# Patient Record
Sex: FEMALE | Race: BLACK OR AFRICAN AMERICAN | NOT HISPANIC OR LATINO | Employment: FULL TIME | URBAN - METROPOLITAN AREA
[De-identification: names, ages, dates, MRNs, and addresses within clinical notes are randomized per-mention and may not be internally consistent; named-entity substitution may affect disease eponyms.]

---

## 2021-03-25 DIAGNOSIS — Z23 ENCOUNTER FOR IMMUNIZATION: ICD-10-CM

## 2021-04-14 ENCOUNTER — OFFICE VISIT (OUTPATIENT)
Dept: FAMILY MEDICINE CLINIC | Facility: HOSPITAL | Age: 51
End: 2021-04-14
Payer: COMMERCIAL

## 2021-04-14 VITALS
DIASTOLIC BLOOD PRESSURE: 88 MMHG | SYSTOLIC BLOOD PRESSURE: 148 MMHG | TEMPERATURE: 98.6 F | HEIGHT: 63 IN | WEIGHT: 196 LBS | HEART RATE: 88 BPM | BODY MASS INDEX: 34.73 KG/M2

## 2021-04-14 DIAGNOSIS — Z00.00 LABORATORY EXAM ORDERED AS PART OF ROUTINE GENERAL MEDICAL EXAMINATION: ICD-10-CM

## 2021-04-14 DIAGNOSIS — Z12.31 ENCOUNTER FOR SCREENING MAMMOGRAM FOR MALIGNANT NEOPLASM OF BREAST: ICD-10-CM

## 2021-04-14 DIAGNOSIS — Z00.00 WELLNESS EXAMINATION: Primary | ICD-10-CM

## 2021-04-14 PROCEDURE — 99203 OFFICE O/P NEW LOW 30 MIN: CPT | Performed by: FAMILY MEDICINE

## 2021-04-14 PROCEDURE — 3008F BODY MASS INDEX DOCD: CPT | Performed by: FAMILY MEDICINE

## 2021-04-14 PROCEDURE — 3725F SCREEN DEPRESSION PERFORMED: CPT | Performed by: FAMILY MEDICINE

## 2021-04-14 NOTE — PATIENT INSTRUCTIONS

## 2021-04-14 NOTE — PROGRESS NOTES
Assessment/Plan:         Diagnoses and all orders for this visit:    Wellness examination    Encounter for screening mammogram for malignant neoplasm of breast  -     Mammo screening bilateral w 3d & cad; Future    BMI 34 0-34 9,adult    Laboratory exam ordered as part of routine general medical examination  -     CBC and Platelet; Future  -     Comprehensive metabolic panel; Future  -     Hemoglobin A1C With EAG; Future  -     Lipid Panel with Direct LDL reflex; Future  -     TSH, 3rd generation; Future  -     CBC and Platelet  -     Comprehensive metabolic panel  -     Hemoglobin A1C With EAG  -     Lipid Panel with Direct LDL reflex  -     TSH, 3rd generation          Subjective:      Patient ID: Nanette Moralez is a 48 y o  female  New patient introduction    Aiming for weight loss by keeping at 1500 shalonda, does cardio 5 days a week  Takes a MVI and collagen    Had wellness check through work and noted the cholesterol was somewhat elevated    Works at [employer], stress is a part of everyday  Sleeping is usually pretty good except for Sunday, sometimes uses Melatonin    FH- father with Type 1 DM         Mother  Htn         1 brother and 5 sisters    Plans to see Dr Norma Ingram for gyn      The following portions of the patient's history were reviewed and updated as appropriate: allergies, current medications, past family history, past medical history, past social history, past surgical history and problem list     Review of Systems   Constitutional: Negative for fatigue and unexpected weight change  HENT: Negative  Eyes: Negative for visual disturbance  Respiratory: Negative  Cardiovascular: Negative  Genitourinary: Negative  Musculoskeletal: Negative  Neurological: Negative  Hematological: Negative  Psychiatric/Behavioral: Negative  All other systems reviewed and are negative          Objective:      /88   Pulse 88   Temp 98 6 °F (37 °C) (Tympanic)   Ht 5' 3" (1 6 m)   Wt 88 9 kg (196 lb)   BMI 34 72 kg/m²          Physical Exam  Vitals signs and nursing note reviewed  Constitutional:       Appearance: Normal appearance  HENT:      Head: Normocephalic and atraumatic  Right Ear: Tympanic membrane, ear canal and external ear normal       Left Ear: Tympanic membrane, ear canal and external ear normal       Mouth/Throat:      Mouth: Mucous membranes are moist    Eyes:      Extraocular Movements: Extraocular movements intact  Pupils: Pupils are equal, round, and reactive to light  Neck:      Vascular: No carotid bruit  Comments: Thyroid mildly nodular with multiple small nodules, no dominant nodule or enlargement  Cardiovascular:      Rate and Rhythm: Normal rate and regular rhythm  Pulses: Normal pulses  Heart sounds: Normal heart sounds  Pulmonary:      Effort: Pulmonary effort is normal       Breath sounds: Normal breath sounds  Abdominal:      General: Abdomen is flat  Palpations: Abdomen is soft  Musculoskeletal: Normal range of motion  Right lower leg: No edema  Left lower leg: No edema  Neurological:      General: No focal deficit present  Mental Status: She is alert and oriented to person, place, and time  Psychiatric:         Mood and Affect: Mood normal          Behavior: Behavior normal          Thought Content: Thought content normal          Judgment: Judgment normal        BMI Counseling: Body mass index is 34 72 kg/m²  The BMI is above normal  Nutrition recommendations include reducing portion sizes, decreasing overall calorie intake and moderation in carbohydrate intake  Exercise recommendations include exercising 3-5 times per week

## 2021-04-17 LAB
ALBUMIN SERPL-MCNC: 4.2 G/DL (ref 3.6–5.1)
ALBUMIN/GLOB SERPL: 1.3 (CALC) (ref 1–2.5)
ALP SERPL-CCNC: 48 U/L (ref 37–153)
ALT SERPL-CCNC: 18 U/L (ref 6–29)
AST SERPL-CCNC: 17 U/L (ref 10–35)
BILIRUB SERPL-MCNC: 0.6 MG/DL (ref 0.2–1.2)
BUN SERPL-MCNC: 17 MG/DL (ref 7–25)
BUN/CREAT SERPL: NORMAL (CALC) (ref 6–22)
CALCIUM SERPL-MCNC: 9.5 MG/DL (ref 8.6–10.4)
CHLORIDE SERPL-SCNC: 103 MMOL/L (ref 98–110)
CHOLEST SERPL-MCNC: 211 MG/DL
CHOLEST/HDLC SERPL: 2.4 (CALC)
CO2 SERPL-SCNC: 25 MMOL/L (ref 20–32)
CREAT SERPL-MCNC: 1.01 MG/DL (ref 0.5–1.05)
ERYTHROCYTE [DISTWIDTH] IN BLOOD BY AUTOMATED COUNT: 12.8 % (ref 11–15)
EST. AVERAGE GLUCOSE BLD GHB EST-MCNC: 108 (CALC)
EST. AVERAGE GLUCOSE BLD GHB EST-SCNC: 6 (CALC)
GLOBULIN SER CALC-MCNC: 3.3 G/DL (CALC) (ref 1.9–3.7)
GLUCOSE SERPL-MCNC: 87 MG/DL (ref 65–99)
HBA1C MFR BLD: 5.4 % OF TOTAL HGB
HCT VFR BLD AUTO: 41.7 % (ref 35–45)
HDLC SERPL-MCNC: 89 MG/DL
HGB BLD-MCNC: 13.8 G/DL (ref 11.7–15.5)
LDLC SERPL CALC-MCNC: 106 MG/DL (CALC)
MCH RBC QN AUTO: 30.3 PG (ref 27–33)
MCHC RBC AUTO-ENTMCNC: 33.1 G/DL (ref 32–36)
MCV RBC AUTO: 91.6 FL (ref 80–100)
NONHDLC SERPL-MCNC: 122 MG/DL (CALC)
PLATELET # BLD AUTO: 273 THOUSAND/UL (ref 140–400)
PMV BLD REES-ECKER: 11.7 FL (ref 7.5–12.5)
POTASSIUM SERPL-SCNC: 4.4 MMOL/L (ref 3.5–5.3)
PROT SERPL-MCNC: 7.5 G/DL (ref 6.1–8.1)
RBC # BLD AUTO: 4.55 MILLION/UL (ref 3.8–5.1)
SL AMB EGFR AFRICAN AMERICAN: 75 ML/MIN/1.73M2
SL AMB EGFR NON AFRICAN AMERICAN: 65 ML/MIN/1.73M2
SODIUM SERPL-SCNC: 138 MMOL/L (ref 135–146)
TRIGL SERPL-MCNC: 73 MG/DL
TSH SERPL-ACNC: 0.75 MIU/L
WBC # BLD AUTO: 5.5 THOUSAND/UL (ref 3.8–10.8)

## 2021-04-19 ENCOUNTER — TELEPHONE (OUTPATIENT)
Dept: FAMILY MEDICINE CLINIC | Facility: HOSPITAL | Age: 51
End: 2021-04-19

## 2021-04-19 NOTE — TELEPHONE ENCOUNTER
----- Message from Ken Serrato MD sent at 4/19/2021  7:05 AM EDT -----  Please inform that all labs were excellent including thyroid level

## 2022-06-20 ENCOUNTER — OFFICE VISIT (OUTPATIENT)
Dept: FAMILY MEDICINE CLINIC | Facility: HOSPITAL | Age: 52
End: 2022-06-20
Payer: COMMERCIAL

## 2022-06-20 VITALS
WEIGHT: 219.8 LBS | HEART RATE: 90 BPM | SYSTOLIC BLOOD PRESSURE: 128 MMHG | TEMPERATURE: 98.2 F | BODY MASS INDEX: 38.95 KG/M2 | HEIGHT: 63 IN | DIASTOLIC BLOOD PRESSURE: 80 MMHG

## 2022-06-20 DIAGNOSIS — H81.12 BENIGN PAROXYSMAL POSITIONAL VERTIGO OF LEFT EAR: ICD-10-CM

## 2022-06-20 DIAGNOSIS — Z12.31 ENCOUNTER FOR SCREENING MAMMOGRAM FOR MALIGNANT NEOPLASM OF BREAST: ICD-10-CM

## 2022-06-20 DIAGNOSIS — H61.22 IMPACTED CERUMEN OF LEFT EAR: Primary | ICD-10-CM

## 2022-06-20 PROCEDURE — 69210 REMOVE IMPACTED EAR WAX UNI: CPT | Performed by: FAMILY MEDICINE

## 2022-06-20 PROCEDURE — 3725F SCREEN DEPRESSION PERFORMED: CPT | Performed by: FAMILY MEDICINE

## 2022-06-20 PROCEDURE — 99213 OFFICE O/P EST LOW 20 MIN: CPT | Performed by: FAMILY MEDICINE

## 2022-06-20 PROCEDURE — 3008F BODY MASS INDEX DOCD: CPT | Performed by: FAMILY MEDICINE

## 2022-06-20 PROCEDURE — 1036F TOBACCO NON-USER: CPT | Performed by: FAMILY MEDICINE

## 2022-06-20 RX ORDER — LORATADINE 10 MG/1
10 TABLET ORAL DAILY
COMMUNITY

## 2022-06-20 NOTE — PROGRESS NOTES
Ear cerumen removal    Date/Time: 6/20/2022 7:06 PM  Performed by: Harshil Avery MD  Authorized by: Harshil Avery MD   Universal Protocol:  Consent: Verbal consent obtained  Written consent not obtained  Risks and benefits: risks, benefits and alternatives were discussed  Consent given by: patient  Time out: Immediately prior to procedure a "time out" was called to verify the correct patient, procedure, equipment, support staff and site/side marked as required  Timeout called at: 6/20/2022 7:06 PM   Patient understanding: patient states understanding of the procedure being performed      Patient location:  Clinic  Procedure details:     Local anesthetic:  None    Location:  L ear    Procedure type: irrigation with instrumentation      Instrumentation: curette      Approach:  Natural orifice  Post-procedure details:     Complication:  None    Hearing quality:  Improved    Patient tolerance of procedure:   Tolerated well, no immediate complications

## 2022-06-20 NOTE — PROGRESS NOTES
Assessment/Plan:         Diagnoses and all orders for this visit:    Impacted cerumen of left ear    BMI 38 0-38 9,adult    Benign paroxysmal positional vertigo of left ear    Encounter for screening mammogram for malignant neoplasm of breast  -     Mammo screening bilateral w 3d & cad; Future    Other orders  -     loratadine (CLARITIN) 10 mg tablet; Take 10 mg by mouth daily          Subjective:      Patient ID: Chetan Turpin is a 46 y o  female  Visit for subacute vertigo    Mainly positional especially in the morning getting out of bed when turning to the L side to turn off the alarm clock  Rapid resolution of symptoms  Some nausea with it    No history of motion sickness  Has perimenopause and was given a cream      The following portions of the patient's history were reviewed and updated as appropriate: allergies, current medications, past family history, past medical history, past social history, past surgical history and problem list     Review of Systems      Objective:      /80   Pulse 90   Temp 98 2 °F (36 8 °C)   Ht 5' 3" (1 6 m)   Wt 99 7 kg (219 lb 12 8 oz)   BMI 38 94 kg/m²          Physical Exam  Vitals and nursing note reviewed  Constitutional:       Appearance: Normal appearance  HENT:      Right Ear: Tympanic membrane, ear canal and external ear normal       Left Ear: There is impacted cerumen  Cardiovascular:      Pulses: Normal pulses  Heart sounds: Normal heart sounds  Pulmonary:      Effort: Pulmonary effort is normal       Breath sounds: Normal breath sounds  Neurological:      Mental Status: She is alert     Psychiatric:         Mood and Affect: Mood normal

## 2022-06-24 ENCOUNTER — TELEPHONE (OUTPATIENT)
Dept: FAMILY MEDICINE CLINIC | Facility: HOSPITAL | Age: 52
End: 2022-06-24

## 2022-10-11 PROBLEM — H61.22 IMPACTED CERUMEN OF LEFT EAR: Status: RESOLVED | Noted: 2022-06-20 | Resolved: 2022-10-11

## 2024-04-23 ENCOUNTER — NURSE TRIAGE (OUTPATIENT)
Age: 54
End: 2024-04-23

## 2024-04-23 NOTE — TELEPHONE ENCOUNTER
"Patient called in regarding pain in the jaw that is radiating to the ear. Patient states that it was painful last evening when biting down. States that ocassionally does have sensitive teeth that react to heat.patient states that she wears a mouth guard at night. Appointment made for tomorrow with NP Daniele.   Reason for Disposition  • Nursing judgment    Answer Assessment - Initial Assessment Questions  1. REASON FOR CALL: \"What is your main concern right now?\"      Jaw pain  2. ONSET: \"When did the jaw pain start?\"      Past few weeks  3. SEVERITY: \"How bad is the pain?\"      moderate  4. FEVER: \"Do you have a fever?\"      denies  5. OTHER SYMPTOMS: \"Do you have any other new symptoms?\"      Ear pain  6. INTERVENTIONS AND RESPONSE: \"What have you done so far to try to make this better? What medications have you used?\"      Mouth guard at night   7. PREGNANCY: \"Is there any chance you are pregnant?\"      N.a    Protocols used: No Protocol Available-ADULT-[place]    "

## 2024-04-25 ENCOUNTER — TELEPHONE (OUTPATIENT)
Dept: FAMILY MEDICINE CLINIC | Facility: HOSPITAL | Age: 54
End: 2024-04-25

## 2024-04-25 ENCOUNTER — OFFICE VISIT (OUTPATIENT)
Dept: FAMILY MEDICINE CLINIC | Facility: HOSPITAL | Age: 54
End: 2024-04-25
Payer: COMMERCIAL

## 2024-04-25 VITALS
BODY MASS INDEX: 40.26 KG/M2 | HEIGHT: 63 IN | TEMPERATURE: 97.7 F | DIASTOLIC BLOOD PRESSURE: 84 MMHG | SYSTOLIC BLOOD PRESSURE: 180 MMHG | WEIGHT: 227.2 LBS | HEART RATE: 91 BPM

## 2024-04-25 DIAGNOSIS — R03.0 ELEVATED BLOOD PRESSURE READING: ICD-10-CM

## 2024-04-25 DIAGNOSIS — H92.02 ACUTE PAIN OF LEFT EAR: Primary | ICD-10-CM

## 2024-04-25 DIAGNOSIS — H61.23 EXCESSIVE CERUMEN IN BOTH EAR CANALS: ICD-10-CM

## 2024-04-25 DIAGNOSIS — R68.84 JAW PAIN: ICD-10-CM

## 2024-04-25 PROCEDURE — 99213 OFFICE O/P EST LOW 20 MIN: CPT | Performed by: NURSE PRACTITIONER

## 2024-04-25 PROCEDURE — 69210 REMOVE IMPACTED EAR WAX UNI: CPT | Performed by: NURSE PRACTITIONER

## 2024-04-25 NOTE — PROGRESS NOTES
Name: Cee Negron      : 1970      MRN: [id number]  Encounter Provider: LEI Price  Encounter Date: 2024   Encounter department: [facility] [address]     Assessment & Plan     1. Acute pain of left ear  Comments:  likely multifactorial w/known TMJ, excessive cerumen, & ETD - wax removed & advise she start daily flonase, see dentist for TMJ eval    2. Jaw pain  Comments:  with known TMJ, f/u with dentist for further eval/treatment    3. Excessive cerumen in both ear canals  Comments:  fully removed from left canal w/curette, partially removed from right canal - advise    4. Elevated blood pressure reading  Comments:  advise she monitor outside of office few times/week & return in 1 month with readings for recheck; limit dietary Na and caffeine           Subjective      The presents with c/o L sided ear and jaw pain. The patient explains that she began to have pain in the L side of her jaw about 4 days ago. The patient started to have increased pressure into her L ear in the following days. The patient has been taking OTC Ibuprofen for the pain with improvement. The patient has been able to eat and drink. The patient expresses that she has started to develop seasonal allergies, as she has had her windows open at home.In addition, the patient explains that she does have an undiagnosed hx of TMJ per her dentist, where she had similar complaints in the fall. The patient presents with concerns as prior, she did not have pain that radiated into the ear.         Review of Systems   HENT:  Positive for ear pain (L ear) and postnasal drip. Negative for congestion, facial swelling, sinus pressure, sinus pain, sore throat, tinnitus and voice change.    Eyes: Negative.    Respiratory: Negative.     Cardiovascular: Negative.    Allergic/Immunologic: Positive for environmental allergies.   Neurological: Negative.        Current Outpatient Medications on File Prior to Visit  "  Medication Sig    loratadine (CLARITIN) 10 mg tablet Take 10 mg by mouth daily       Objective     BP (!) 180/84   Pulse 91   Temp 97.7 °F (36.5 °C)   Ht 5' 3\" (1.6 m)   Wt 103 kg (227 lb 3.2 oz)   BMI 40.25 kg/m²     Physical Exam  Vitals and nursing note reviewed.   Constitutional:       General: She is not in acute distress.     Appearance: Normal appearance. She is not ill-appearing.   HENT:      Head: Normocephalic and atraumatic.      Right Ear: External ear normal. No decreased hearing noted. No tenderness. There is impacted cerumen. No mastoid tenderness. Tympanic membrane is not erythematous.      Left Ear: External ear normal. No decreased hearing noted. No tenderness. A middle ear effusion is present. There is impacted cerumen. No mastoid tenderness. Tympanic membrane is not erythematous.      Ears:      Comments: Excessive cerumen in bilat canals - fully removed w/curette on left, partially removed w/curette on right     Mouth/Throat:      Lips: Pink. No lesions.      Mouth: Mucous membranes are moist.      Dentition: Normal dentition.      Pharynx: Oropharynx is clear. No pharyngeal swelling or posterior oropharyngeal erythema.   Eyes:      General: No scleral icterus.     Conjunctiva/sclera: Conjunctivae normal.   Neck:      Thyroid: No thyroid mass, thyromegaly or thyroid tenderness.   Cardiovascular:      Rate and Rhythm: Normal rate and regular rhythm.      Heart sounds: Normal heart sounds, S1 normal and S2 normal. No murmur heard.  Pulmonary:      Effort: Pulmonary effort is normal.      Breath sounds: Normal breath sounds and air entry. No decreased air movement. No decreased breath sounds, wheezing, rhonchi or rales.   Musculoskeletal:      Cervical back: Normal range of motion and neck supple.   Lymphadenopathy:      Head:      Right side of head: No submental, submandibular, preauricular or posterior auricular adenopathy.      Left side of head: No submental, submandibular, preauricular " or posterior auricular adenopathy.      Cervical: No cervical adenopathy.   Skin:     General: Skin is warm and dry.   Neurological:      Mental Status: She is alert.         Ear cerumen removal    Date/Time: 4/25/2024 9:46 AM    Performed by: LEI Price  Authorized by: LEI Price  Universal Protocol:  Procedure performed by: (NP student)  Consent: Verbal consent obtained.  Timeout called at: 4/25/2024 9:56 AM.  Patient understanding: patient states understanding of the procedure being performed    Patient location:  Clinic  Procedure details:     Local anesthetic:  None    Location:  L ear and R ear    Procedure type: curette      Approach:  External  Post-procedure details:     Complication:  None    Hearing quality:  Normal    Patient tolerance of procedure:  Tolerated well, no immediate complications        LEI Price

## 2024-12-19 ENCOUNTER — OFFICE VISIT (OUTPATIENT)
Dept: FAMILY MEDICINE CLINIC | Facility: HOSPITAL | Age: 54
End: 2024-12-19
Payer: COMMERCIAL

## 2024-12-19 VITALS
TEMPERATURE: 97.5 F | HEART RATE: 76 BPM | SYSTOLIC BLOOD PRESSURE: 162 MMHG | OXYGEN SATURATION: 99 % | DIASTOLIC BLOOD PRESSURE: 96 MMHG | WEIGHT: 225 LBS | BODY MASS INDEX: 39.87 KG/M2 | HEIGHT: 63 IN

## 2024-12-19 DIAGNOSIS — Z00.00 ANNUAL PHYSICAL EXAM: ICD-10-CM

## 2024-12-19 DIAGNOSIS — E66.812 CLASS 2 SEVERE OBESITY DUE TO EXCESS CALORIES WITH SERIOUS COMORBIDITY AND BODY MASS INDEX (BMI) OF 39.0 TO 39.9 IN ADULT (HCC): ICD-10-CM

## 2024-12-19 DIAGNOSIS — E66.01 CLASS 2 SEVERE OBESITY DUE TO EXCESS CALORIES WITH SERIOUS COMORBIDITY AND BODY MASS INDEX (BMI) OF 39.0 TO 39.9 IN ADULT (HCC): ICD-10-CM

## 2024-12-19 DIAGNOSIS — Z12.11 SCREEN FOR COLON CANCER: Primary | ICD-10-CM

## 2024-12-19 DIAGNOSIS — R03.0 ELEVATED BLOOD PRESSURE READING WITHOUT DIAGNOSIS OF HYPERTENSION: ICD-10-CM

## 2024-12-19 DIAGNOSIS — Z12.31 ENCOUNTER FOR SCREENING MAMMOGRAM FOR BREAST CANCER: ICD-10-CM

## 2024-12-19 PROCEDURE — 99396 PREV VISIT EST AGE 40-64: CPT | Performed by: NURSE PRACTITIONER

## 2024-12-19 NOTE — PROGRESS NOTES
Adult Annual Physical  Name: Cee Negron      : 1970      MRN: [id number]  Encounter Provider: LEI Narvaez  Encounter Date: 2024   Encounter department: [facility] [address]     Assessment & Plan  Annual physical exam         Class 2 severe obesity due to excess calories with serious comorbidity and body mass index (BMI) of 39.0 to 39.9 in adult (HCC)    Orders:    CBC and differential; Future    Comprehensive metabolic panel; Future    Lipid panel; Future    TSH, 3rd generation with Free T4 reflex; Future    Hemoglobin A1C With EAG; Future    Ambulatory referral to Nutrition Services; Future    Elevated blood pressure reading without diagnosis of hypertension  Bp is elevated and she reports it has been at other appointments.   Unsure if this is white coat or HTN.   Instructed to start taking BP at home. Instructions on home BP monitoring given.   F/U in 4 weeks and bring readings to that appointment.        Screen for colon cancer    Orders:    Cologuard    Encounter for screening mammogram for breast cancer    Orders:    Mammo screening bilateral w 3d and cad; Future      Immunizations and preventive care screenings were discussed with patient today. Appropriate education was printed on patient's after visit summary.    Counseling:  Alcohol/drug use: discussed moderation in alcohol intake, the recommendations for healthy alcohol use, and avoidance of illicit drug use.  Dental Health: discussed importance of regular tooth brushing, flossing, and dental visits.  Injury prevention: discussed safety/seat belts, safety helmets, smoke detectors, carbon monoxide detectors, and smoking near bedding or upholstery.  Sexual health: discussed sexually transmitted diseases, partner selection, use of condoms, avoidance of unintended pregnancy, and contraceptive alternatives.  Exercise: the importance of regular exercise/physical activity was discussed. Recommend exercise 3-5  "times per week for at least 30 minutes.          History of Present Illness     Adult Annual Physical:  Patient presents for annual physical. Diet used to be better. Lately no energy to exercise. Reports BP has been high at other appointments. Does not check at home. Mom with HTN. Dad had diabetes. .     Diet and Physical Activity:  - Diet/Nutrition: well balanced diet.  - Exercise: no formal exercise.    General Health:  - Sleep: sleeps poorly and snores loudly. difficulty staying asleep  - Hearing: normal hearing bilateral ears.  - Vision: wears glasses and goes for regular eye exams.  - Dental: regular dental visits.    /GYN Health:  - Follows with GYN: yes.   - Menopause: perimenopausal.   - Contraception: male partner had vasectomy.      Advanced Care Planning:  - Has an advanced directive?: no    - Has a durable medical POA?: no    - ACP document given to patient?: yes      Review of Systems   Constitutional:  Positive for fatigue. Negative for activity change, appetite change, chills, diaphoresis, fever and unexpected weight change.   HENT:  Positive for congestion and postnasal drip. Negative for dental problem, drooling, ear discharge, ear pain, facial swelling, hearing loss, mouth sores, nosebleeds, rhinorrhea, sinus pressure, sinus pain, sneezing, sore throat, tinnitus, trouble swallowing and voice change.    Eyes: Negative.    Respiratory:  Positive for wheezing (with lying down). Negative for apnea, cough, choking, chest tightness, shortness of breath and stridor.    Cardiovascular: Negative.    Gastrointestinal: Negative.    Endocrine: Negative.    Genitourinary: Negative.    Musculoskeletal: Negative.    Skin: Negative.    Neurological: Negative.    Hematological: Negative.    Psychiatric/Behavioral: Negative.           Objective   /96 (Patient Position: Sitting, Cuff Size: Standard)   Pulse 76   Temp 97.5 °F (36.4 °C) (Tympanic)   Ht 5' 3\" (1.6 m)   Wt 102 kg (225 lb)   SpO2 99%   BMI " 39.86 kg/m²     Physical Exam  Vitals reviewed.   Constitutional:       Appearance: Normal appearance. She is obese.   HENT:      Head: Normocephalic and atraumatic.      Right Ear: Tympanic membrane, ear canal and external ear normal.      Left Ear: Tympanic membrane, ear canal and external ear normal.      Nose: Nose normal.      Mouth/Throat:      Mouth: Mucous membranes are moist.      Pharynx: Oropharynx is clear.   Eyes:      Conjunctiva/sclera: Conjunctivae normal.      Pupils: Pupils are equal, round, and reactive to light.   Neck:      Thyroid: No thyromegaly.   Cardiovascular:      Rate and Rhythm: Normal rate and regular rhythm.      Heart sounds: Normal heart sounds. No murmur heard.  Pulmonary:      Effort: Pulmonary effort is normal.      Breath sounds: Normal breath sounds.   Abdominal:      General: Abdomen is flat. Bowel sounds are normal.      Palpations: Abdomen is soft. There is no hepatomegaly or splenomegaly.      Tenderness: There is no abdominal tenderness.   Musculoskeletal:         General: Normal range of motion.      Cervical back: Normal range of motion and neck supple.   Skin:     General: Skin is warm and dry.      Capillary Refill: Capillary refill takes less than 2 seconds.   Neurological:      General: No focal deficit present.      Mental Status: She is alert and oriented to person, place, and time.   Psychiatric:         Mood and Affect: Mood normal.         Behavior: Behavior normal.         Thought Content: Thought content normal.         Judgment: Judgment normal.

## 2024-12-19 NOTE — PATIENT INSTRUCTIONS
"Patient Education     Routine physical for adults   The Basics   Written by the doctors and editors at [facility]   What is a physical? -- A physical is a routine visit, or \"check-up,\" with your doctor. You might also hear it called a \"wellness visit\" or \"preventive visit.\"  During each visit, the doctor will:   Ask about your physical and mental health   Ask about your habits, behaviors, and lifestyle   Do an exam   Give you vaccines if needed   Talk to you about any medicines you take   Give advice about your health   Answer your questions  Getting regular check-ups is an important part of taking care of your health. It can help your doctor find and treat any problems you have. But it's also important for preventing health problems.  A routine physical is different from a \"sick visit.\" A sick visit is when you see a doctor because of a health concern or problem. Since physicals are scheduled ahead of time, you can think about what you want to ask the doctor.  How often should I get a physical? -- It depends on your age and health. In general, for people age 21 years and older:   If you are younger than 50 years, you might be able to get a physical every 3 years.   If you are 50 years or older, your doctor might recommend a physical every year.  If you have an ongoing health condition, like diabetes or high blood pressure, your doctor will probably want to see you more often.  What happens during a physical? -- In general, each visit will include:   Physical exam - The doctor or nurse will check your height, weight, heart rate, and blood pressure. They will also look at your eyes and ears. They will ask about how you are feeling and whether you have any symptoms that bother you.   Medicines - It's a good idea to bring a list of all the medicines you take to each doctor visit. Your doctor will talk to you about your medicines and answer any questions. Tell them if you are having any side effects that bother you. You " "should also tell them if you are having trouble paying for any of your medicines.   Habits and behaviors - This includes:   Your diet   Your exercise habits   Whether you smoke, drink alcohol, or use drugs   Whether you are sexually active   Whether you feel safe at home  Your doctor will talk to you about things you can do to improve your health and lower your risk of health problems. They will also offer help and support. For example, if you want to quit smoking, they can give you advice and might prescribe medicines. If you want to improve your diet or get more physical activity, they can help you with this, too.   Lab tests, if needed - The tests you get will depend on your age and situation. For example, your doctor might want to check your:   Cholesterol   Blood sugar   Iron level   Vaccines - The recommended vaccines will depend on your age, health, and what vaccines you already had. Vaccines are very important because they can prevent certain serious or deadly infections.   Discussion of screening - \"Screening\" means checking for diseases or other health problems before they cause symptoms. Your doctor can recommend screening based on your age, risk, and preferences. This might include tests to check for:   Cancer, such as breast, prostate, cervical, ovarian, colorectal, prostate, lung, or skin cancer   Sexually transmitted infections, such as chlamydia and gonorrhea   Mental health conditions like depression and anxiety  Your doctor will talk to you about the different types of screening tests. They can help you decide which screenings to have. They can also explain what the results might mean.   Answering questions - The physical is a good time to ask the doctor or nurse questions about your health. If needed, they can refer you to other doctors or specialists, too.  Adults older than 65 years often need other care, too. As you get older, your doctor will talk to you about:   How to prevent falling at " home   Hearing or vision tests   Memory testing   How to take your medicines safely   Making sure that you have the help and support you need at home  All topics are updated as new evidence becomes available and our peer review process is complete.  This topic retrieved from [employer] on: May 02, 2024.  Topic 031675 Version 1.0  Release: 32.4.3 - C32.122  © 2024 UpToDate, Inc. and/or its affiliates. All rights reserved.  Consumer Information Use and Disclaimer   Disclaimer: This generalized information is a limited summary of diagnosis, treatment, and/or medication information. It is not meant to be comprehensive and should be used as a tool to help the user understand and/or assess potential diagnostic and treatment options. It does NOT include all information about conditions, treatments, medications, side effects, or risks that may apply to a specific patient. It is not intended to be medical advice or a substitute for the medical advice, diagnosis, or treatment of a health care provider based on the health care provider's examination and assessment of a patient's specific and unique circumstances. Patients must speak with a health care provider for complete information about their health, medical questions, and treatment options, including any risks or benefits regarding use of medications. This information does not endorse any treatments or medications as safe, effective, or approved for treating a specific patient. UpToDate, Inc. and its affiliates disclaim any warranty or liability relating to this information or the use thereof.The use of this information is governed by the Terms of Use, available at https://www.woltersSmarter Grid Solutionsuwer.com/en/know/clinical-effectiveness-terms. 2024© UpToDate, Inc. and its affiliates and/or licensors. All rights reserved.  Copyright   © 2024 UpToDate, Inc. and/or its affiliates. All rights reserved.    Patient Education     Mediterranean diet   The Basics   Written by the doctors and  editors at [employer]   What is a Mediterranean diet? -- A Mediterranean diet is a heart-healthy way of eating. It includes foods and cooking styles from many countries around the [place] Sea, like [place] and [place]. The exact foods included vary from place to place.  A Mediterranean diet involves eating a lot of fruits, vegetables, nuts, and whole grains. It uses olive oil instead of other fats. It also includes some fish, poultry, and dairy products, but not a lot of red meat.  Wine is often thought of as part of a Mediterranean diet. It is not needed, and you might choose not to include it. If you do drink alcohol, limit the amount to:   For females, no more than 1 drink a day   For males, no more than 2 drinks a day  What are the benefits of a Mediterranean diet? -- A Mediterranean diet can help you:   Improve your overall health, and help you lose weight   Lower your risk of stroke   Lower your risk of heart problems such as a heart attack   Manage your blood sugar if you have diabetes  What can I eat and drink on a Mediterranean diet? -- A Mediterranean diet is more of an eating pattern than a strict diet. Try to cover two-thirds of your plate with fresh fruits and vegetables. Some examples of foods that are often part of this pattern:   Grains - Whole grains like whole-grain bread and pasta, oats, couscous, brown rice, barley, and orzo.   Fruits - Many kinds and colors of fresh, frozen, dried, or canned fruits. Frozen or canned fruits with 100% fruit juice or water (without added sugar). Examples include apples, pears, berries, melons, bananas, plums, raisins, figs, and peaches.   Vegetables - Many kinds and colors of fresh, frozen, or canned vegetables. If canned, low sodium or salt free. If frozen, without added fat and sodium. Examples include avocados, peppers, tomatoes, spinach, kale, beans, carrots, peas, olives, cucumbers, hummus, soybeans, lentils, and kidney beans.   Dairy - Low-fat milk, cheese,  and other dairy products. Greek yogurt, kefir, and plant-based milk alternatives like soy milk.   Lean meats, poultry, seafood, and proteins - [place], tuna, cod, and other fish. Shrimp, clams, scallops, and mussels. White meat chicken and turkey, eggs, dried beans, lentils, and tofu. Nuts such as walnuts, almonds, pecans, hazelnuts, cashews, peanuts, and nut butters. Seeds such as pumpkin, sesame, flax, and sunflower seeds.   Fats, oils, and other foods - Foods with healthy fats found in fish, nuts, and avocados. Olive oil or vegetable oils such as canola oil. Use onions, garlic, spices, and herbs to season food.  What foods and drinks should I avoid or limit on a Mediterranean diet? -- A Mediterranean diet involves avoiding or limiting certain types of foods. Try to avoid foods with additives like artificial sweeteners. Avoid foods that are processed, refined, or preserved. These are often foods with a very long shelf life.   Grains to avoid - White bread, pasta, white rice, crackers, and biscuits.   Fruits to avoid - Fruits canned or frozen with extra sugar.   Vegetables to avoid - Commercially prepared potatoes and vegetable mixes, regular canned vegetables and juices, and vegetables frozen with sauce or pickled vegetables.   Dairy to avoid - Whole-fat dairy products like cheese, ice cream, whole milk, cream, and buttermilk.   Lean meats, poultry, seafood, and proteins to avoid - Red meat such as beef, pork, and lamb. Processed meats such as sausages, deli meats, salami, hot dogs, and feldman.   Fats, oils, and other foods to avoid - Butter, margarine, lard, gravies, sauces, and salad dressing. Cookies, cakes, candy, doughnuts, muffins, and other sweets.  All topics are updated as new evidence becomes available and our peer review process is complete.  This topic retrieved from [email or web address] on: Apr 04, 2024.  Topic 583064 Version 2.0  Release: 32.2.4 - C32.93  © 2024 UpToDate, Inc. and/or its affiliates. All rights  reserved.  Consumer Information Use and Disclaimer   Disclaimer: This generalized information is a limited summary of diagnosis, treatment, and/or medication information. It is not meant to be comprehensive and should be used as a tool to help the user understand and/or assess potential diagnostic and treatment options. It does NOT include all information about conditions, treatments, medications, side effects, or risks that may apply to a specific patient. It is not intended to be medical advice or a substitute for the medical advice, diagnosis, or treatment of a health care provider based on the health care provider's examination and assessment of a patient's specific and unique circumstances. Patients must speak with a health care provider for complete information about their health, medical questions, and treatment options, including any risks or benefits regarding use of medications. This information does not endorse any treatments or medications as safe, effective, or approved for treating a specific patient. UpToDate, Inc. and its affiliates disclaim any warranty or liability relating to this information or the use thereof.The use of this information is governed by the Terms of Use, available at https://www.woltersTongtechuwer.com/en/know/clinical-effectiveness-terms. 2024© UpToDate, Inc. and its affiliates and/or licensors. All rights reserved.  Copyright   © 2024 UpToDate, Inc. and/or its affiliates. All rights reserved.

## 2025-01-13 ENCOUNTER — OFFICE VISIT (OUTPATIENT)
Dept: FAMILY MEDICINE CLINIC | Facility: HOSPITAL | Age: 55
End: 2025-01-13
Payer: COMMERCIAL

## 2025-01-13 VITALS
HEART RATE: 88 BPM | OXYGEN SATURATION: 99 % | SYSTOLIC BLOOD PRESSURE: 158 MMHG | BODY MASS INDEX: 40.57 KG/M2 | WEIGHT: 229 LBS | DIASTOLIC BLOOD PRESSURE: 86 MMHG | TEMPERATURE: 97.6 F | HEIGHT: 63 IN

## 2025-01-13 DIAGNOSIS — J40 TRACHEOBRONCHITIS: Primary | ICD-10-CM

## 2025-01-13 PROCEDURE — 99214 OFFICE O/P EST MOD 30 MIN: CPT | Performed by: NURSE PRACTITIONER

## 2025-01-13 RX ORDER — PREDNISONE 10 MG/1
TABLET ORAL
Qty: 32 TABLET | Refills: 0 | Status: SHIPPED | OUTPATIENT
Start: 2025-01-13

## 2025-01-13 NOTE — PROGRESS NOTES
"Name: Cee Negron      : 1970      MRN: [id number]  Encounter Provider: LEI Narvaez  Encounter Date: 2025   Encounter department: [facility] [address]   :  Assessment & Plan  Tracheobronchitis  Will trial prednisone. I suspect viral even though cough is present for over one month.   Well appearing and no fever.   She will call with no improvement or any worsening.   Orders:    predniSONE 10 mg tablet; 4 tabs x 3 days, 3 tabs x 3 days 2 tabs x 3 days 1 tab x 3 days, 1/2 tab x 3 days then stop           History of Present Illness     Cough started over 1 month ago. Was taking OTC cough med. Was getting better but got worse again. Wheezing with lying down. Cough is productive. No fever or chills. Had one episode of sob with going up steps. Had runny nose which has improved. No post nasal drip. No ear pain or sore throat.       Review of Systems   Constitutional:  Negative for chills and fever.   HENT:  Negative for congestion, ear pain, rhinorrhea and sore throat.    Respiratory:  Positive for cough, shortness of breath and wheezing.        Objective   /86 (Patient Position: Sitting, Cuff Size: Standard)   Pulse 88   Temp 97.6 °F (36.4 °C) (Tympanic)   Ht 5' 3\" (1.6 m)   Wt 104 kg (229 lb)   SpO2 99%   BMI 40.57 kg/m²      Physical Exam  Vitals reviewed.   Constitutional:       Appearance: Normal appearance.   Cardiovascular:      Rate and Rhythm: Normal rate and regular rhythm.      Heart sounds: Normal heart sounds. No murmur heard.  Pulmonary:      Effort: Pulmonary effort is normal.      Breath sounds: Normal breath sounds.      Comments: Mild wheezing over trachea.   Skin:     General: Skin is warm and dry.   Neurological:      Mental Status: She is alert and oriented to person, place, and time.   Psychiatric:         Mood and Affect: Mood normal.         Behavior: Behavior normal.         Thought Content: Thought content normal.         Judgment: " Judgment normal.

## 2025-01-22 ENCOUNTER — RESULTS FOLLOW-UP (OUTPATIENT)
Dept: FAMILY MEDICINE CLINIC | Facility: HOSPITAL | Age: 55
End: 2025-01-22

## 2025-01-22 LAB
ALBUMIN SERPL-MCNC: 3.9 G/DL (ref 3.6–5.1)
ALBUMIN/GLOB SERPL: 1.1 (CALC) (ref 1–2.5)
ALP SERPL-CCNC: 71 U/L (ref 37–153)
ALT SERPL-CCNC: 41 U/L (ref 6–29)
AST SERPL-CCNC: 24 U/L (ref 10–35)
BASOPHILS # BLD AUTO: 88 CELLS/UL (ref 0–200)
BASOPHILS NFR BLD AUTO: 0.6 %
BILIRUB SERPL-MCNC: 0.4 MG/DL (ref 0.2–1.2)
BUN SERPL-MCNC: 24 MG/DL (ref 7–25)
BUN/CREAT SERPL: 22 (CALC) (ref 6–22)
CALCIUM SERPL-MCNC: 8.8 MG/DL (ref 8.6–10.4)
CHLORIDE SERPL-SCNC: 101 MMOL/L (ref 98–110)
CHOLEST SERPL-MCNC: 235 MG/DL
CHOLEST/HDLC SERPL: 2.5 (CALC)
CO2 SERPL-SCNC: 28 MMOL/L (ref 20–32)
CREAT SERPL-MCNC: 1.1 MG/DL (ref 0.5–1.03)
EOSINOPHIL # BLD AUTO: 277 CELLS/UL (ref 15–500)
EOSINOPHIL NFR BLD AUTO: 1.9 %
ERYTHROCYTE [DISTWIDTH] IN BLOOD BY AUTOMATED COUNT: 13.3 % (ref 11–15)
EST. AVERAGE GLUCOSE BLD GHB EST-MCNC: 131 MG/DL
EST. AVERAGE GLUCOSE BLD GHB EST-SCNC: 7.3 MMOL/L
GFR/BSA.PRED SERPLBLD CYS-BASED-ARV: 60 ML/MIN/1.73M2
GLOBULIN SER CALC-MCNC: 3.7 G/DL (CALC) (ref 1.9–3.7)
GLUCOSE SERPL-MCNC: 91 MG/DL (ref 65–99)
HBA1C MFR BLD: 6.2 % OF TOTAL HGB
HCT VFR BLD AUTO: 41.1 % (ref 35–45)
HDLC SERPL-MCNC: 93 MG/DL
HGB BLD-MCNC: 13.6 G/DL (ref 11.7–15.5)
LDLC SERPL CALC-MCNC: 116 MG/DL (CALC)
LYMPHOCYTES # BLD AUTO: 4672 CELLS/UL (ref 850–3900)
LYMPHOCYTES NFR BLD AUTO: 32 %
MCH RBC QN AUTO: 29.8 PG (ref 27–33)
MCHC RBC AUTO-ENTMCNC: 33.1 G/DL (ref 32–36)
MCV RBC AUTO: 90.1 FL (ref 80–100)
MONOCYTES # BLD AUTO: 832 CELLS/UL (ref 200–950)
MONOCYTES NFR BLD AUTO: 5.7 %
NEUTROPHILS # BLD AUTO: 8731 CELLS/UL (ref 1500–7800)
NEUTROPHILS NFR BLD AUTO: 59.8 %
NONHDLC SERPL-MCNC: 142 MG/DL (CALC)
PLATELET # BLD AUTO: 295 THOUSAND/UL (ref 140–400)
PMV BLD REES-ECKER: 11.4 FL (ref 7.5–12.5)
POTASSIUM SERPL-SCNC: 4 MMOL/L (ref 3.5–5.3)
PROT SERPL-MCNC: 7.6 G/DL (ref 6.1–8.1)
RBC # BLD AUTO: 4.56 MILLION/UL (ref 3.8–5.1)
SODIUM SERPL-SCNC: 138 MMOL/L (ref 135–146)
TRIGL SERPL-MCNC: 149 MG/DL
TSH SERPL-ACNC: 2.18 MIU/L
WBC # BLD AUTO: 14.6 THOUSAND/UL (ref 3.8–10.8)

## 2025-01-27 ENCOUNTER — OFFICE VISIT (OUTPATIENT)
Dept: FAMILY MEDICINE CLINIC | Facility: HOSPITAL | Age: 55
End: 2025-01-27
Payer: COMMERCIAL

## 2025-01-27 VITALS
HEART RATE: 101 BPM | DIASTOLIC BLOOD PRESSURE: 72 MMHG | WEIGHT: 234.4 LBS | BODY MASS INDEX: 41.53 KG/M2 | OXYGEN SATURATION: 98 % | HEIGHT: 63 IN | TEMPERATURE: 97.7 F | SYSTOLIC BLOOD PRESSURE: 142 MMHG

## 2025-01-27 DIAGNOSIS — R73.03 PREDIABETES: ICD-10-CM

## 2025-01-27 DIAGNOSIS — I10 PRIMARY HYPERTENSION: Primary | ICD-10-CM

## 2025-01-27 LAB — COLOGUARD RESULT REPORTABLE: NEGATIVE

## 2025-01-27 PROCEDURE — 99214 OFFICE O/P EST MOD 30 MIN: CPT | Performed by: NURSE PRACTITIONER

## 2025-01-27 RX ORDER — AMLODIPINE BESYLATE 5 MG/1
5 TABLET ORAL DAILY
Qty: 30 TABLET | Refills: 1 | Status: SHIPPED | OUTPATIENT
Start: 2025-01-27

## 2025-01-27 NOTE — ASSESSMENT & PLAN NOTE
Once again BP is elevated. Also with elevated home readings.   Start amlodipine at 5 mg.   Continue to check BP at home and record. Bring cuff to next OV to correlate.   Orders:    amLODIPine (NORVASC) 5 mg tablet; Take 1 tablet (5 mg total) by mouth daily

## 2025-01-27 NOTE — ASSESSMENT & PLAN NOTE
A1C is 6.2.   We discuss diet and exercise.   She will be seeing nutritionist next month.   Plan to recheck in 3 months.   Orders:    CBC and differential; Future    Hemoglobin A1C With EAG; Future    Basic metabolic panel; Future

## 2025-02-24 ENCOUNTER — CLINICAL SUPPORT (OUTPATIENT)
Dept: NUTRITION | Facility: HOSPITAL | Age: 55
End: 2025-02-24
Payer: COMMERCIAL

## 2025-02-24 VITALS — BODY MASS INDEX: 40.14 KG/M2 | WEIGHT: 226.6 LBS

## 2025-02-24 DIAGNOSIS — E66.01 CLASS 2 SEVERE OBESITY DUE TO EXCESS CALORIES WITH SERIOUS COMORBIDITY AND BODY MASS INDEX (BMI) OF 39.0 TO 39.9 IN ADULT (HCC): ICD-10-CM

## 2025-02-24 DIAGNOSIS — E66.812 CLASS 2 SEVERE OBESITY DUE TO EXCESS CALORIES WITH SERIOUS COMORBIDITY AND BODY MASS INDEX (BMI) OF 39.0 TO 39.9 IN ADULT (HCC): ICD-10-CM

## 2025-02-24 PROCEDURE — 97802 MEDICAL NUTRITION INDIV IN: CPT

## 2025-02-24 NOTE — PROGRESS NOTES
" Nutrition Assessment Form    Patient Name: Cee Negron    YOB: 1970    Sex: Female     Assessment Date: 2/24/2025  Start Time: 1250 Stop Time: 150 Total Minutes: 60     Data:  Present at session: self   Parent/Patient Concerns/reason for visit: \"I'm not keeping the weight off like I used to and I have less energy to work out\"   Medical Dx/Reason for Referral: obesity   Past Medical History:   Diagnosis Date    Obesity     40       Current Outpatient Medications   Medication Sig Dispense Refill    amLODIPine (NORVASC) 5 mg tablet Take 1 tablet (5 mg total) by mouth daily 30 tablet 1     No current facility-administered medications for this visit.        Additional Meds/Supplements: Collagen with coffee   Special Learning Needs/barriers to learning/any new barriers N/a   Height: Ht Readings from Last 5 Encounters:   01/27/25 5' 3\" (1.6 m)   01/13/25 5' 3\" (1.6 m)   12/19/24 5' 3\" (1.6 m)   04/25/24 5' 3\" (1.6 m)   06/20/22 5' 3\" (1.6 m)      Weight: Wt Readings from Last 10 Encounters:   01/27/25 106 kg (234 lb 6.4 oz)   01/13/25 104 kg (229 lb)   12/19/24 102 kg (225 lb)   04/25/24 103 kg (227 lb 3.2 oz)   06/20/22 99.7 kg (219 lb 12.8 oz)   04/14/21 88.9 kg (196 lb)     Estimated body mass index is 41.52 kg/m² as calculated from the following:    Height as of 1/27/25: 5' 3\" (1.6 m).    Weight as of 1/27/25: 106 kg (234 lb 6.4 oz).   Recent Weight Change: [x]Yes     []No  Amount: 8# loss x 1 month desired and intentional      Energy Needs: 1650cals (25kcals/kg-1000 for 2#/wk wt loss)   No Known Allergies or intolerances lactose   Social History     Substance and Sexual Activity   Alcohol Use Yes    Alcohol/week: 7.0 standard drinks of alcohol    Types: 7 Standard drinks or equivalent per week    Cocktail with each dinner- maybe more on weekends- gin and club soda some tequila and scotch/whiskey   Social History     Tobacco Use   Smoking Status Passive Smoke Exposure - Never Smoker   Smokeless " Tobacco Never       Who shops? patient   Who cooks/cooking methods/Eating out/take out habits   patient  Cooking methods: bake/knapp/air knapp/grill/boil/other________    Out for lunch- x3/wk   Exercise: Walking- challenge just started challenge this month- 5k at work and then additional on treadmill- 2.5mph and incline  Weight training 2x/wk-15-20mins- kettle bells (15-20#) and bar bells (10-12#)- since before [employer] twice a week- March to Dec   Other: ie: Sleep habits/ stress level/ work habits household-lives with ?/ food security Sleep by 10pm and waking up at 430 (used to work in [facility] [employer]) back to sleep from 5-6am-feels good int he morning- Mondays are tough- does not sleep well on Sundays  Does not take naps- consistent energy daily  Stress level- 3-4/10  Working - switched jobs 2 yeas ago and cut back on stress- 40 hours /wk    Good work life balance  Lives alone   Prior Nutritional Counseling? []Yes     [x]No  When:      Why:         Diet Hx:  eating approx 1700 cals daily-  drinking water daily-   Breakfast:  skips B on weekends Diet:  8am at work- kodiak crunchy (180cals 10gms protein) and coffee (decaf -with collagen (8gms protein))  Or Oatmeal with blueberries- lactaid milk overnight- with superfood sometimes  Or eggs - could be an omelet- peppers whole grain toast- [id number]  Peanut butter whole grain bread   Lunch:  never skips   Out for lunch  Chipotle Rice chicken salsa and cheese  Salad with chicken and couscous  Leftovers- lasagne- vegetables added- bison  Ham s/w   Dinner:never skips 6  roasted vegetables and rice- pork tenderloin or ribs     Snacks: AM - n/a  PM - n/a  HS - 7-8- cheese x 2oz and triscuits or pretzels or popcorn   Other Notes/ Initial Assessment:  Cee is here for a refresher course on what she should eat to lose weight and feel better/have more energy.  She works int he [employer] industry and switched jobs 2 years ago which has really cut down on her stress  level.  She just started a walking challenge this morning trying to get 10k steps daily and has already lost 8#.  She is using [employer] to count her calories and her macronutrients.       Discussed the importance of a healthy lifestyle and it's impact on overall health, inlcuding adequate sleep, consistent activity, healthy stress management techniques, importance of regular consistent food, portioning foods, front loading calories as able and adequate fluids throughout the day.  Encouraged food journaling, Provided 1800 shalonda sample menus, wt loss tips and sleep management techniques and healthy snack ideas and RD name and home use.      Updated assessment (Follow up note only):           Nutrition Diagnosis:   Overweight/obesity  related to Excess energy intake as evidenced by  BMI more than normative standard for age and sex (obesity-grade III 40+)       Any change or new dx since previous visit:     Nutrition Diagnosis:   N/a      Medical Nutrition Therapy Intervention:  [x]Individualized Meal Plan-Discussed the importance of eating 3 meals daily and not skipping, and how metabolism is affected.  Also discussed adding in small snacks or 5-6 small meals daily if desired vs 3 larger ones, and appropriate options and portions.  Appropriate timing of meals, including eating within 1 hr of waking and eating meals slowly 20mins/meals, minimizing mindless/boredom or habitual eating, etc was also mentioned.  Discussed the plate method of portioning foods, including half a plate fruits and vegetables or a half plate all vegetables, 1/4 of the plate a lean protein source or meat, and a 1/4 of the plate being a whole grain carb- usually 1/2-1c.  This should be followed for at least 2 meals of the day, but could also be followed for all 3.  Fluid intake discussed and appropriate amounts and choices, 16 oz with meals and additional 32oz during the day.  S/S dehydration also covered. []Understanding Lab Values   []Basic  Pathophysiology of Disease []Food/Medication Interactions   []Food Diary [x]Exercise-150 mins of moderate activity weekly, discussed importance of variety of activity, including wt bearing activities 2-3x/wk x 10-15mins. Discussed importance of activity throughout the lifecycle and its impact on overall health/stress management, etc.   [x]Lifestyle/Behavior Modification Techniques-Discussed the importance of adequate sleep, and ideal sleeping conditions, including a cool temperature 64-68 degrees F, and a dark and quiet room. Also discussed the importance of a regular and consistent sleep routine, including minimizing blue light exposure an hour before sleeping.  Weekend habits should include staying fairly consistent with weekday sleep habits to minimize disruption during the week.  A connection was made between getting adequate and good quality sleep and the ability to handle stress the next day, make healthy food choices, and be active. []Medication, Mechanism of Action   []Label Reading: CHO/ Na/ Fat/ other_________ []Self Blood Glucose Monitoring   [x]Weight/BMI Goals: gain/lose/maintain-Short term goal of 2-4# x 1 month or next visit []Other -           Comprehension: []Excellent  []Very Good  [x]Good  []Fair   []Poor    Receptivity: []Excellent  []Very Good  [x]Good  []Fair   []Poor    Expected Compliance: []Excellent  []Very Good  [x]Good  []Fair   []Poor        Goals (initial)/ Progress made on previous goals/new goals:   3 meals daily no skipping   2.portions per palte method as dicussed with RD   3. Approx 80oz fluid daily       No follow-ups on file.  Labs:  CMP  Lab Results   Component Value Date    K 4.0 01/21/2025     01/21/2025    CO2 28 01/21/2025    BUN 24 01/21/2025    CREATININE 1.10 (H) 01/21/2025    CALCIUM 8.8 01/21/2025    AST 24 01/21/2025    ALT 41 (H) 01/21/2025    ALKPHOS 71 01/21/2025    EGFR 60 01/21/2025       BMP  Lab Results   Component Value Date    CALCIUM 8.8 01/21/2025    K  "4.0 01/21/2025    CO2 28 01/21/2025     01/21/2025    BUN 24 01/21/2025    CREATININE 1.10 (H) 01/21/2025       Lipids  No results found for: \"CHOL\"  Lab Results   Component Value Date    HDL 93 01/21/2025    HDL 89 04/16/2021     Lab Results   Component Value Date    LDLCALC 116 (H) 01/21/2025    LDLCALC 106 (H) 04/16/2021     Lab Results   Component Value Date    TRIG 149 01/21/2025    TRIG 73 04/16/2021     No results found for: \"CHOLHDL\"    Hemoglobin A1C  Lab Results   Component Value Date    HGBA1C 6.2 (H) 01/21/2025       Fasting Glucose  No results found for: \"GLUF\"    Insulin     Thyroid  Lab Results   Component Value Date    TSH 0.75 04/16/2021       Hepatic Function Panel  Lab Results   Component Value Date    ALT 41 (H) 01/21/2025    AST 24 01/21/2025    ALKPHOS 71 01/21/2025       Celiac Disease Antibody Panel  No results found for: \"ENDOMYSIAL IGA\", \"GLIADIN IGA\", \"GLIADIN IGG\", \"IGA\", \"TISSUE TRANSGLUT AB\", \"TTG IGA\"   Iron  No results found for: \"IRON\", \"TIBC\", \"FERRITIN\"         Fany Ramsay RD  [facility] CLINICAL NUTRITION [facility]  [address]  [place] [id number]    "

## 2025-02-25 ENCOUNTER — OFFICE VISIT (OUTPATIENT)
Dept: FAMILY MEDICINE CLINIC | Facility: HOSPITAL | Age: 55
End: 2025-02-25
Payer: COMMERCIAL

## 2025-02-25 VITALS
HEART RATE: 82 BPM | BODY MASS INDEX: 40.4 KG/M2 | WEIGHT: 228 LBS | DIASTOLIC BLOOD PRESSURE: 82 MMHG | TEMPERATURE: 97.8 F | SYSTOLIC BLOOD PRESSURE: 150 MMHG | HEIGHT: 63 IN

## 2025-02-25 DIAGNOSIS — I10 PRIMARY HYPERTENSION: Primary | ICD-10-CM

## 2025-02-25 PROCEDURE — 99214 OFFICE O/P EST MOD 30 MIN: CPT | Performed by: NURSE PRACTITIONER

## 2025-02-25 RX ORDER — LISINOPRIL 10 MG/1
10 TABLET ORAL DAILY
Qty: 30 TABLET | Refills: 1 | Status: SHIPPED | OUTPATIENT
Start: 2025-02-25

## 2025-02-25 RX ORDER — AMLODIPINE BESYLATE 5 MG/1
5 TABLET ORAL DAILY
Qty: 90 TABLET | Refills: 1 | Status: SHIPPED | OUTPATIENT
Start: 2025-02-25

## 2025-02-25 NOTE — PROGRESS NOTES
"Name: Cee Negron      : 1970      MRN: [id number]  Encounter Provider: LEI Narvaez  Encounter Date: 2025   Encounter department: [facility] CARE [address]   :  Assessment & Plan  Primary hypertension  BP remains elevated on amlodipine 5 mg.   Will hold off on dose increase due to potential for leg swelling at higher dose.   Will instead add in lisinopril.   Continue with diet and exercise.   Continue checking Bp at home.   F/U in 4 weeks.   Orders:    lisinopril (ZESTRIL) 10 mg tablet; Take 1 tablet (10 mg total) by mouth daily    amLODIPine (NORVASC) 5 mg tablet; Take 1 tablet (5 mg total) by mouth daily           History of Present Illness   Has been checking BP. No lower than 140s. Highs in the 160s.   Met with nutritionist today.   Having some episodes where heart seems to be racing. No skipping beats.       Review of Systems   Constitutional:  Negative for fatigue and unexpected weight change.   Eyes:  Negative for visual disturbance.   Respiratory:  Negative for shortness of breath.    Cardiovascular:  Negative for chest pain, palpitations and leg swelling.   Neurological:  Negative for dizziness, syncope, light-headedness and headaches.       Objective   /82 (Patient Position: Sitting, Cuff Size: Standard)   Pulse 82   Temp 97.8 °F (36.6 °C) (Tympanic)   Ht 5' 3\" (1.6 m)   Wt 103 kg (228 lb)   BMI 40.39 kg/m²      Physical Exam  Vitals reviewed.   Constitutional:       Appearance: Normal appearance. She is well-developed.   Cardiovascular:      Rate and Rhythm: Normal rate and regular rhythm.      Heart sounds: Normal heart sounds. No murmur heard.  Pulmonary:      Effort: Pulmonary effort is normal.      Breath sounds: Normal breath sounds.   Skin:     General: Skin is warm and dry.   Neurological:      Mental Status: She is alert and oriented to person, place, and time.   Psychiatric:         Mood and Affect: Mood normal.         Behavior: Behavior " normal.         Thought Content: Thought content normal.         Judgment: Judgment normal.

## 2025-02-25 NOTE — ASSESSMENT & PLAN NOTE
BP remains elevated on amlodipine 5 mg.   Will hold off on dose increase due to potential for leg swelling at higher dose.   Will instead add in lisinopril.   Continue with diet and exercise.   Continue checking Bp at home.   F/U in 4 weeks.   Orders:    lisinopril (ZESTRIL) 10 mg tablet; Take 1 tablet (10 mg total) by mouth daily    amLODIPine (NORVASC) 5 mg tablet; Take 1 tablet (5 mg total) by mouth daily

## 2025-03-25 ENCOUNTER — OFFICE VISIT (OUTPATIENT)
Dept: FAMILY MEDICINE CLINIC | Facility: HOSPITAL | Age: 55
End: 2025-03-25
Payer: COMMERCIAL

## 2025-03-25 VITALS
OXYGEN SATURATION: 98 % | DIASTOLIC BLOOD PRESSURE: 80 MMHG | HEIGHT: 63 IN | WEIGHT: 221 LBS | SYSTOLIC BLOOD PRESSURE: 142 MMHG | HEART RATE: 79 BPM | BODY MASS INDEX: 39.16 KG/M2

## 2025-03-25 DIAGNOSIS — Z12.31 ENCOUNTER FOR SCREENING MAMMOGRAM FOR BREAST CANCER: ICD-10-CM

## 2025-03-25 DIAGNOSIS — I10 PRIMARY HYPERTENSION: Primary | ICD-10-CM

## 2025-03-25 PROCEDURE — 99214 OFFICE O/P EST MOD 30 MIN: CPT | Performed by: NURSE PRACTITIONER

## 2025-03-25 RX ORDER — AMLODIPINE BESYLATE 10 MG/1
10 TABLET ORAL DAILY
Qty: 30 TABLET | Refills: 1 | Status: SHIPPED | OUTPATIENT
Start: 2025-03-25

## 2025-03-25 NOTE — PROGRESS NOTES
"Name: Cee Negron      : 1970      MRN: [id number]  Encounter Provider: LEI Narvaez  Encounter Date: 3/25/2025   Encounter department: [facility] PRIMARY CARE [address]   :  Assessment & Plan  Primary hypertension  Bp remains above goal.   Will increase amlodipine to 10 mg.   Continue with 10 mg lisinopril.   Continue with lifestyle changes and weight loss.   Continue to check BP at home.   F/U in 4 weeks.   Orders:    amLODIPine (NORVASC) 10 mg tablet; Take 1 tablet (10 mg total) by mouth daily    Encounter for screening mammogram for breast cancer    Orders:    Mammo screening bilateral w 3d and cad; Future           History of Present Illness   Has been taking Bp at home. Bp is better with adding lsinopril. Still with readings in the 150 range. She met with dietician. Has decreased sodium in her diet. Increased daily step count to 15,000. She has lost 8 pounds.       Review of Systems   Constitutional:  Negative for fatigue and unexpected weight change.   Eyes:  Negative for visual disturbance.   Respiratory:  Negative for shortness of breath.    Cardiovascular:  Negative for chest pain, palpitations and leg swelling.   Neurological:  Negative for dizziness, syncope, light-headedness and headaches.       Objective   /80   Pulse 79   Ht 5' 3\" (1.6 m)   Wt 100 kg (221 lb)   SpO2 98%   BMI 39.15 kg/m²      Physical Exam  Vitals reviewed.   Constitutional:       Appearance: Normal appearance.   Cardiovascular:      Rate and Rhythm: Normal rate and regular rhythm.      Heart sounds: Normal heart sounds. No murmur heard.  Pulmonary:      Effort: Pulmonary effort is normal.      Breath sounds: Normal breath sounds.   Skin:     General: Skin is warm and dry.   Neurological:      Mental Status: She is alert and oriented to person, place, and time.   Psychiatric:         Mood and Affect: Mood normal.         Behavior: Behavior normal.         Thought Content: Thought content " normal.         Judgment: Judgment normal.

## 2025-03-25 NOTE — ASSESSMENT & PLAN NOTE
Bp remains above goal.   Will increase amlodipine to 10 mg.   Continue with 10 mg lisinopril.   Continue with lifestyle changes and weight loss.   Continue to check BP at home.   F/U in 4 weeks.   Orders:    amLODIPine (NORVASC) 10 mg tablet; Take 1 tablet (10 mg total) by mouth daily

## 2025-03-28 DIAGNOSIS — I10 PRIMARY HYPERTENSION: ICD-10-CM

## 2025-03-28 RX ORDER — LISINOPRIL 10 MG/1
10 TABLET ORAL DAILY
Qty: 30 TABLET | Refills: 1 | Status: SHIPPED | OUTPATIENT
Start: 2025-03-28

## 2025-03-31 RX ORDER — LISINOPRIL 10 MG/1
10 TABLET ORAL DAILY
Qty: 90 TABLET | Refills: 0 | Status: SHIPPED | OUTPATIENT
Start: 2025-03-31

## 2025-04-17 ENCOUNTER — CLINICAL SUPPORT (OUTPATIENT)
Dept: NUTRITION | Facility: HOSPITAL | Age: 55
End: 2025-04-17
Payer: COMMERCIAL

## 2025-04-17 VITALS — BODY MASS INDEX: 38.44 KG/M2 | WEIGHT: 217 LBS

## 2025-04-17 DIAGNOSIS — E66.812 OBESITY, CLASS II, BMI 35-39.9: Primary | ICD-10-CM

## 2025-04-17 PROCEDURE — 97803 MED NUTRITION INDIV SUBSEQ: CPT | Performed by: DIETITIAN, REGISTERED

## 2025-04-17 NOTE — PROGRESS NOTES
" Nutrition Assessment Form    Patient Name: Cee Negron    YOB: 1970    Sex: Female     Assessment Date: 4/17/2025  Start Time: 1 Stop Time: 130 Total Minutes: 30     Data:  Present at session: self   Parent/Patient Concerns/reason for visit: \"Things are pretty good\"   Medical Dx/Reason for Referral: obesity   Past Medical History:   Diagnosis Date    Obesity     40       Current Outpatient Medications   Medication Sig Dispense Refill    amLODIPine (NORVASC) 10 mg tablet Take 1 tablet (10 mg total) by mouth daily 30 tablet 1    lisinopril (ZESTRIL) 10 mg tablet Take 1 tablet (10 mg total) by mouth daily 90 tablet 0     No current facility-administered medications for this visit.        Additional Meds/Supplements: Collagen with coffee   Special Learning Needs/barriers to learning/any new barriers N/a   Height: Ht Readings from Last 5 Encounters:   03/25/25 5' 3\" (1.6 m)   02/25/25 5' 3\" (1.6 m)   01/27/25 5' 3\" (1.6 m)   01/13/25 5' 3\" (1.6 m)   12/19/24 5' 3\" (1.6 m)      Weight: Wt Readings from Last 10 Encounters:   03/25/25 100 kg (221 lb)   02/25/25 103 kg (228 lb)   02/24/25 103 kg (226 lb 9.6 oz)   01/27/25 106 kg (234 lb 6.4 oz)   01/13/25 104 kg (229 lb)   12/19/24 102 kg (225 lb)   04/25/24 103 kg (227 lb 3.2 oz)   06/20/22 99.7 kg (219 lb 12.8 oz)   04/14/21 88.9 kg (196 lb)     Estimated body mass index is 39.15 kg/m² as calculated from the following:    Height as of 3/25/25: 5' 3\" (1.6 m).    Weight as of 3/25/25: 100 kg (221 lb).   Recent Weight Change: [x]Yes     []No  Amount: 9# loss x 1 month desired and intentional      Energy Needs: 1650cals (25kcals/kg-1000 for 2#/wk wt loss)   No Known Allergies or intolerances lactose   Social History     Substance and Sexual Activity   Alcohol Use Yes    Alcohol/week: 7.0 standard drinks of alcohol    Types: 7 Standard drinks or equivalent per week    Cocktail with each dinner- maybe more on weekends- gin and club soda some tequila and " scotch/luci   Social History     Tobacco Use   Smoking Status Passive Smoke Exposure - Never Smoker   Smokeless Tobacco Never       Who shops? patient   Who cooks/cooking methods/Eating out/take out habits   patient  Cooking methods: bake/knapp/air knapp/grill/boil/other________    Out for lunch- x3/wk   Exercise: Walking- challenge just started challenge this month- 5k at work and then additional on treadmill- 2.5mph and incline  Weight training 2x/wk-15-20mins- kettle bells (15-20#) and bar bells (10-12#)- since before [employer] twice a week- March to Dec   Other: ie: Sleep habits/ stress level/ work habits household-lives with ?/ food security Sleep by 10pm and waking up at 430 (used to work in [facility] [employer]) back to sleep from 5-6am-feels good int he morning- Mondays are tough- does not sleep well on Sundays  Does not take naps- consistent energy daily  Stress level- 3-4/10  Working - switched jobs 2 yeas ago and cut back on stress- 40 hours /wk    Good work life balance  Lives alone   Prior Nutritional Counseling? []Yes     [x]No  When:      Why:         Diet Hx:  eating approx 1700 cals daily-  drinking water daily-   Breakfast:  skips B on weekends Diet:  8am at work- kodiak crunchy (180cals 10gms protein) and coffee (decaf -with collagen (8gms protein))  Or Oatmeal with blueberries- lactaid milk overnight- with superfood sometimes  Or eggs - could be an omelet- peppers whole grain toast- b9ssuns  Peanut butter whole grain bread   Lunch:  never skips   Out for lunch  Chipotle Rice chicken salsa and cheese  Salad with chicken and couscous  Leftovers- lasagne- vegetables added- bison  Ham s/w   Dinner:never skips 6  roasted vegetables and rice- pork tenderloin or ribs     Snacks: AM - n/a  PM - n/a  HS - 7-8- cheese x 2oz and triscuits or pretzels or popcorn   Other Notes/ Initial Assessment:  Cee is here for a refresher course on what she should eat to lose weight and feel better/have  more energy.  She works int he [employer] industry and switched jobs 2 years ago which has really cut down on her stress level.  She just started a walking challenge this morning trying to get 10k steps daily and has already lost 8#.  She is using [employer] to count her calories and her macronutrients.       Discussed the importance of a healthy lifestyle and it's impact on overall health, inlcuding adequate sleep, consistent activity, healthy stress management techniques, importance of regular consistent food, portioning foods, front loading calories as able and adequate fluids throughout the day.  Encouraged food journaling, Provided 1800 shalonda sample menus, wt loss tips and sleep management techniques and healthy snack ideas and RD name and home use.      Updated assessment (Follow up note only):  4/17/25  Cee is here with a 9# wt loss in a little less than a month she is logging her foods and is eating about [id number]# which she is comfortable with.  She is also working out consistently walking at least 10k steps daily and working out with weights 4x/wk with weights 4 x/wk and her energy is much better.  Her sleep patterns are also getting better- she is getting consistently 6 hours of sleep. She gets hot at night which wakes her up multiple times.  She is drinking 1-2 ETOH drinks Fri Sat and Sunday.  She is feeling satisfied and is paying attention to her snacking, will eat if she is hungry.  She will have a smoothie in the evening with almond yogurt. She has an 8 oz smoothie int he evening.  She has been making her own homemade protein bars for breakfast.  She continues to work on her fluid intake, 24 oz water x 2, 2 12 oz cups of tea and sparkling water at dinner. She is pleased with her progress and plans to continue her journey.  No follow up desired at this time.       Nutrition Diagnosis:   Overweight/obesity  related to Excess energy intake as evidenced by  BMI more than normative standard for age and  sex (obesity-grade III 40+)       Any change or new dx since previous visit:     Nutrition Diagnosis:   N/a      Medical Nutrition Therapy Intervention:  [x]Individualized Meal Plan-Discussed the importance of eating 3 meals daily and not skipping, and how metabolism is affected.  Also discussed adding in small snacks or 5-6 small meals daily if desired vs 3 larger ones, and appropriate options and portions.  Appropriate timing of meals, including eating within 1 hr of waking and eating meals slowly 20mins/meals, minimizing mindless/boredom or habitual eating, etc was also mentioned.  Discussed the plate method of portioning foods, including half a plate fruits and vegetables or a half plate all vegetables, 1/4 of the plate a lean protein source or meat, and a 1/4 of the plate being a whole grain carb- usually 1/2-1c.  This should be followed for at least 2 meals of the day, but could also be followed for all 3.  Fluid intake discussed and appropriate amounts and choices, 16 oz with meals and additional 32oz during the day.  S/S dehydration also covered. []Understanding Lab Values   []Basic Pathophysiology of Disease []Food/Medication Interactions   []Food Diary [x]Exercise-150 mins of moderate activity weekly, discussed importance of variety of activity, including wt bearing activities 2-3x/wk x 10-15mins. Discussed importance of activity throughout the lifecycle and its impact on overall health/stress management, etc.   [x]Lifestyle/Behavior Modification Techniques-Discussed the importance of adequate sleep, and ideal sleeping conditions, including a cool temperature 64-68 degrees F, and a dark and quiet room. Also discussed the importance of a regular and consistent sleep routine, including minimizing blue light exposure an hour before sleeping.  Weekend habits should include staying fairly consistent with weekday sleep habits to minimize disruption during the week.  A connection was made between getting adequate  "and good quality sleep and the ability to handle stress the next day, make healthy food choices, and be active. []Medication, Mechanism of Action   []Label Reading: CHO/ Na/ Fat/ other_________ []Self Blood Glucose Monitoring   [x]Weight/BMI Goals: gain/lose/maintain-Short term goal of 2-4# x 1 month or next visit []Other -           Comprehension: []Excellent  []Very Good  [x]Good  []Fair   []Poor    Receptivity: []Excellent  []Very Good  [x]Good  []Fair   []Poor    Expected Compliance: []Excellent  []Very Good  [x]Good  []Fair   []Poor        Goals (initial)/ Progress made on previous goals/new goals:   3 meals daily no skipping-  achieved continue   2.portions per palte method as dicussed with RD- achieved continue   3. Approx 80oz fluid daily- achieved continue       No follow-ups on file.  Labs:  CMP  Lab Results   Component Value Date    K 4.0 01/21/2025     01/21/2025    CO2 28 01/21/2025    BUN 24 01/21/2025    CREATININE 1.10 (H) 01/21/2025    CALCIUM 8.8 01/21/2025    AST 24 01/21/2025    ALT 41 (H) 01/21/2025    ALKPHOS 71 01/21/2025    EGFR 60 01/21/2025       BMP  Lab Results   Component Value Date    CALCIUM 8.8 01/21/2025    K 4.0 01/21/2025    CO2 28 01/21/2025     01/21/2025    BUN 24 01/21/2025    CREATININE 1.10 (H) 01/21/2025       Lipids  No results found for: \"CHOL\"  Lab Results   Component Value Date    HDL 93 01/21/2025    HDL 89 04/16/2021     Lab Results   Component Value Date    LDLCALC 116 (H) 01/21/2025    LDLCALC 106 (H) 04/16/2021     Lab Results   Component Value Date    TRIG 149 01/21/2025    TRIG 73 04/16/2021     No results found for: \"CHOLHDL\"    Hemoglobin A1C  Lab Results   Component Value Date    HGBA1C 6.2 (H) 01/21/2025       Fasting Glucose  No results found for: \"GLUF\"    Insulin     Thyroid  Lab Results   Component Value Date    TSH 0.75 04/16/2021       Hepatic Function Panel  Lab Results   Component Value Date    ALT 41 (H) 01/21/2025    AST 24 01/21/2025    " "ALKPHOS 71 01/21/2025       Celiac Disease Antibody Panel  No results found for: \"ENDOMYSIAL IGA\", \"GLIADIN IGA\", \"GLIADIN IGG\", \"IGA\", \"TISSUE TRANSGLUT AB\", \"TTG IGA\"   Iron  No results found for: \"IRON\", \"TIBC\", \"FERRITIN\"         Fany Ramsay RD  [facility] CLINICAL NUTRITION SERVICES  [address] [place] [phone]    "

## 2025-04-22 ENCOUNTER — OFFICE VISIT (OUTPATIENT)
Dept: FAMILY MEDICINE CLINIC | Facility: HOSPITAL | Age: 55
End: 2025-04-22
Payer: COMMERCIAL

## 2025-04-22 VITALS
HEIGHT: 63 IN | OXYGEN SATURATION: 98 % | SYSTOLIC BLOOD PRESSURE: 128 MMHG | BODY MASS INDEX: 38.59 KG/M2 | HEART RATE: 70 BPM | WEIGHT: 217.8 LBS | TEMPERATURE: 97 F | DIASTOLIC BLOOD PRESSURE: 78 MMHG

## 2025-04-22 DIAGNOSIS — I10 PRIMARY HYPERTENSION: Primary | ICD-10-CM

## 2025-04-22 PROCEDURE — 99213 OFFICE O/P EST LOW 20 MIN: CPT | Performed by: NURSE PRACTITIONER

## 2025-04-22 RX ORDER — AMLODIPINE BESYLATE 10 MG/1
10 TABLET ORAL DAILY
Qty: 100 TABLET | Refills: 1 | Status: SHIPPED | OUTPATIENT
Start: 2025-04-22

## 2025-04-22 RX ORDER — LISINOPRIL 10 MG/1
10 TABLET ORAL DAILY
Qty: 100 TABLET | Refills: 1 | Status: SHIPPED | OUTPATIENT
Start: 2025-04-22

## 2025-04-22 NOTE — PROGRESS NOTES
"Name: Cee Negron      : 1970      MRN: [id number]  Encounter Provider: LEI Narvaez  Encounter Date: 2025   Encounter department: [facility] CARE [address]   :  Assessment & Plan  Primary hypertension  BP is controlled on both amlodipine and lisinopril.   Continue with this regimen.   Plan to f/u in 3 months with labs preceding.   Orders:    amLODIPine (NORVASC) 10 mg tablet; Take 1 tablet (10 mg total) by mouth daily    lisinopril (ZESTRIL) 10 mg tablet; Take 1 tablet (10 mg total) by mouth daily           History of Present Illness   Checking Bp at home and running 120s/70s. She has noticed mild leg swelling. No headaches, dizziness, chest pain or sob.       Review of Systems   Constitutional:  Negative for fatigue and unexpected weight change.   Eyes:  Negative for visual disturbance.   Respiratory:  Negative for shortness of breath.    Cardiovascular:  Negative for chest pain, palpitations and leg swelling.   Neurological:  Negative for dizziness, syncope, light-headedness and headaches.       Objective   /78 (Patient Position: Sitting, Cuff Size: Standard)   Pulse 70   Temp (!) 97 °F (36.1 °C) (Tympanic)   Ht 5' 3\" (1.6 m)   Wt 98.8 kg (217 lb 12.8 oz)   SpO2 98%   BMI 38.58 kg/m²      Physical Exam  Vitals reviewed.   Constitutional:       Appearance: Normal appearance. She is well-developed.   Cardiovascular:      Rate and Rhythm: Normal rate and regular rhythm.      Heart sounds: Normal heart sounds. No murmur heard.  Pulmonary:      Effort: Pulmonary effort is normal.      Breath sounds: Normal breath sounds.   Skin:     General: Skin is warm and dry.   Neurological:      Mental Status: She is alert and oriented to person, place, and time.   Psychiatric:         Mood and Affect: Mood normal.         Behavior: Behavior normal.         Thought Content: Thought content normal.         Judgment: Judgment normal.         "

## 2025-04-22 NOTE — ASSESSMENT & PLAN NOTE
BP is controlled on both amlodipine and lisinopril.   Continue with this regimen.   Plan to f/u in 3 months with labs preceding.   Orders:    amLODIPine (NORVASC) 10 mg tablet; Take 1 tablet (10 mg total) by mouth daily    lisinopril (ZESTRIL) 10 mg tablet; Take 1 tablet (10 mg total) by mouth daily

## 2025-05-05 ENCOUNTER — NURSE TRIAGE (OUTPATIENT)
Age: 55
End: 2025-05-05

## 2025-05-05 NOTE — TELEPHONE ENCOUNTER
"FOLLOW UP: Callback by PCP today    REASON FOR CONVERSATION: Dizziness (/)    SYMPTOMS: Patient called with a dizzy episode on Saturday. Patient stated she took her Amlodipine and experienced extreme dizziness about 1-2 hours after. Patient did not take Amlodipine yesterday or today and has not experienced any dizziness since Saturday.    OTHER: BP Readings:  5/3- 119/77 (took Lisinopril & Amlodipine)  5/4- 116/77 (only took Lisinopril)  5/5- 119/69 (only took Lisinopril)    DISPOSITION: Callback by PCP Today    Reason for Disposition   Caller has NON-URGENT medicine question about med that PCP or specialist prescribed and triager unable to answer question    Answer Assessment - Initial Assessment Questions  1. NAME of MEDICINE: \"What medicine(s) are you calling about?\"      Amlodipine  2. QUESTION: \"What is your question?\" (e.g., double dose of medicine, side effect)      Side effect  3. PRESCRIBER: \"Who prescribed the medicine?\" Reason: if prescribed by specialist, call should be referred to that group.      LEI Narvaez  4. SYMPTOMS: \"Do you have any symptoms?\" If Yes, ask: \"What symptoms are you having?\"  \"How bad are the symptoms (e.g., mild, moderate, severe)      Patient called with a dizzy episode on Saturday. Patient stated she took her Amlodipine and experienced extreme dizziness about 1-2 hours after. Patient did not take Amlodipine yesterday or today and has not experienced any dizziness since Saturday.  5. PREGNANCY:  \"Is there any chance that you are pregnant?\" \"When was your last menstrual period?\"      N/A    Protocols used: Medication Question Call-Adult-[place]    "

## 2025-05-05 NOTE — TELEPHONE ENCOUNTER
Stop amlodipine. Continue to monitor BP. If consistently > 140/90 she should let me know otherwise f/u as scheduled in July.